# Patient Record
Sex: MALE | Race: WHITE
[De-identification: names, ages, dates, MRNs, and addresses within clinical notes are randomized per-mention and may not be internally consistent; named-entity substitution may affect disease eponyms.]

---

## 2019-04-02 ENCOUNTER — HOSPITAL ENCOUNTER (EMERGENCY)
Dept: HOSPITAL 25 - UCCORT | Age: 54
Discharge: HOME | End: 2019-04-02
Payer: COMMERCIAL

## 2019-04-02 VITALS — DIASTOLIC BLOOD PRESSURE: 86 MMHG | SYSTOLIC BLOOD PRESSURE: 139 MMHG

## 2019-04-02 DIAGNOSIS — S01.01XD: Primary | ICD-10-CM
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
## 2019-04-02 PROCEDURE — 99201: CPT

## 2019-04-02 PROCEDURE — G0463 HOSPITAL OUTPT CLINIC VISIT: HCPCS

## 2019-04-02 NOTE — UC
UC General HPI





- HPI Summary


HPI Summary: 





per triage, Staple removal. Placed at [facility] 14 days ago after a fall. 





dneies HA, nausea. has no complaints.








- History of Current Complaint


Chief Complaint: UCLaceration


Stated Complaint: STAPLES(DONE @ [facility])REMOVAL


Time Seen by Provider: 04/02/19 12:56


Pain Intensity: 0


Associated Signs & Symptoms: Negative: Fever, Headache





- Allergy/Home Medications


Allergies/Adverse Reactions: 


 Allergies











Allergy/AdvReac Type Severity Reaction Status Date / Time


 


seasonal allergies Allergy  Difficulty Uncoded 04/02/19 12:59





   Breathing  











Home Medications: 


 Home Medications





Metoprolol Tartrate TAB* [Lopressor TAB*] 25 mg PO BID 04/02/19 [History 

Confirmed 04/02/19]


Pantoprazole TAB * [Protonix TAB*] 40 mg PO DAILY 04/02/19 [History Confirmed 04 /02/19]


Warfarin TAB(*) [Coumadin TAB(*)] 6 mg PO DAILY@1700 04/02/19 [History 

Confirmed 04/02/19]


predniSONE TAB* [Deltasone 20 MG TAB*] 40 mg PO DAILY 04/02/19 [History 

Confirmed 04/02/19]











PMH/Surg Hx/FS Hx/Imm Hx





- Additional Past Medical History


Additional PMH: 





Lupus, dvt


Cardiovascular History: Hypertension


GI/ History: Gastroesophageal Reflux





- Surgical History


Surgical History: Yes


Surgery Procedure, Year, and Place: Left 2012 shoulder surgery





- Social History


Alcohol Use: Occasionally


Substance Use Type: None


Smoking Status (MU): Never Smoked Tobacco





- Immunization History


Most Recent Influenza Vaccination: not this season





Review of Systems


All Other Systems Reviewed And Are Negative: No


Constitutional: Negative: Fever


Skin: Negative: Rash


Eyes: Negative: Blurred Vision, Diplopia


Neurological: Negative: Headache





Physical Exam


Triage Information Reviewed: Yes


Appearance: Well-Appearing


Vital Signs: 


 Initial Vital Signs











Temp  97.8 F   04/02/19 12:56


 


Pulse  72   04/02/19 12:56


 


Resp  17   04/02/19 12:56


 


BP  139/86   04/02/19 12:56


 


Pulse Ox  100   04/02/19 12:56











Vital Signs Reviewed: Yes


Eyes: Positive: Conjunctiva Clear


ENT: Positive: Normal ENT inspection


Neck: Positive: Supple


Respiratory: Positive: No respiratory distress


Musculoskeletal: Positive: ROM Intact


Neurological: Positive: Alert


Psychological: Positive: Age Appropriate Behavior


Skin Exam: Normal, Other - 9 staples L scalp. Site healed. No red, swelling or 

tenderness.





Course/Dx





- Course


Course Of Treatment: 





PROCEDURE: STAPLES REMOVED BY THIS PA. PT TOLERATED WELL.





- Diagnoses


Provider Diagnosis: 


 Removal of staple








Discharge





- Sign-Out/Discharge


Documenting (check all that apply): Patient Departure


All imaging exams completed and their final reports reviewed: No Studies





- Discharge Plan


Condition: Stable


Disposition: HOME


Patient Education Materials:  Stitches Removal (ED)


Additional Instructions: 


FOLLOW UP PRIMARY CARE AS NEEDED.





- Billing Disposition and Condition


Condition: STABLE


Disposition: Home